# Patient Record
Sex: MALE | Race: OTHER | HISPANIC OR LATINO
[De-identification: names, ages, dates, MRNs, and addresses within clinical notes are randomized per-mention and may not be internally consistent; named-entity substitution may affect disease eponyms.]

---

## 2020-11-04 ENCOUNTER — TRANSCRIPTION ENCOUNTER (OUTPATIENT)
Age: 60
End: 2020-11-04

## 2021-02-10 ENCOUNTER — NON-APPOINTMENT (OUTPATIENT)
Age: 61
End: 2021-02-10

## 2021-02-10 PROBLEM — Z00.00 ENCOUNTER FOR PREVENTIVE HEALTH EXAMINATION: Status: ACTIVE | Noted: 2021-02-10

## 2021-02-24 ENCOUNTER — APPOINTMENT (OUTPATIENT)
Dept: OTOLARYNGOLOGY | Facility: CLINIC | Age: 61
End: 2021-02-24
Payer: COMMERCIAL

## 2021-02-24 PROCEDURE — 99072 ADDL SUPL MATRL&STAF TM PHE: CPT

## 2021-02-24 PROCEDURE — 92537 CALORIC VSTBLR TEST W/REC: CPT

## 2021-02-24 PROCEDURE — 92540 BASIC VESTIBULAR EVALUATION: CPT

## 2021-08-05 ENCOUNTER — APPOINTMENT (OUTPATIENT)
Dept: PHARMACY | Facility: CLINIC | Age: 61
End: 2021-08-05

## 2023-03-06 ENCOUNTER — APPOINTMENT (OUTPATIENT)
Dept: GASTROENTEROLOGY | Facility: CLINIC | Age: 63
End: 2023-03-06
Payer: COMMERCIAL

## 2023-03-06 VITALS
SYSTOLIC BLOOD PRESSURE: 130 MMHG | HEART RATE: 66 BPM | TEMPERATURE: 98 F | BODY MASS INDEX: 26.33 KG/M2 | HEIGHT: 65 IN | DIASTOLIC BLOOD PRESSURE: 76 MMHG | OXYGEN SATURATION: 99 % | WEIGHT: 158 LBS

## 2023-03-06 DIAGNOSIS — Z01.818 ENCOUNTER FOR OTHER PREPROCEDURAL EXAMINATION: ICD-10-CM

## 2023-03-06 DIAGNOSIS — K63.5 POLYP OF COLON: ICD-10-CM

## 2023-03-06 DIAGNOSIS — R10.13 EPIGASTRIC PAIN: ICD-10-CM

## 2023-03-06 PROCEDURE — 99204 OFFICE O/P NEW MOD 45 MIN: CPT

## 2023-03-06 RX ORDER — GABAPENTIN 100 MG/1
100 CAPSULE ORAL
Qty: 60 | Refills: 0 | Status: ACTIVE | COMMUNITY
Start: 2022-10-19

## 2023-03-06 RX ORDER — ACETAZOLAMIDE 250 MG/1
250 TABLET ORAL
Qty: 180 | Refills: 0 | Status: ACTIVE | COMMUNITY
Start: 2023-01-09

## 2023-03-06 RX ORDER — ASPIRIN 81 MG/1
81 TABLET, COATED ORAL
Qty: 90 | Refills: 0 | Status: ACTIVE | COMMUNITY
Start: 2022-10-18

## 2023-03-06 RX ORDER — POLYETHYLENE GLYCOL 3350 AND ELECTROLYTES WITH LEMON FLAVOR 236; 22.74; 6.74; 5.86; 2.97 G/4L; G/4L; G/4L; G/4L; G/4L
236 POWDER, FOR SOLUTION ORAL
Qty: 1 | Refills: 0 | Status: ACTIVE | COMMUNITY
Start: 2023-03-06 | End: 1900-01-01

## 2023-03-06 RX ORDER — CLOTRIMAZOLE 10 MG/G
1 CREAM TOPICAL
Qty: 60 | Refills: 0 | Status: ACTIVE | COMMUNITY
Start: 2022-10-10

## 2023-03-06 RX ORDER — ADHESIVE TAPE 3"X 2.3 YD
50 MCG TAPE, NON-MEDICATED TOPICAL
Qty: 30 | Refills: 0 | Status: ACTIVE | COMMUNITY
Start: 2022-10-10

## 2023-03-06 RX ORDER — DICLOFENAC SODIUM 1% 10 MG/G
1 GEL TOPICAL
Qty: 100 | Refills: 0 | Status: ACTIVE | COMMUNITY
Start: 2022-10-03

## 2023-03-06 RX ORDER — ROSUVASTATIN CALCIUM 10 MG/1
10 TABLET, FILM COATED ORAL
Qty: 90 | Refills: 0 | Status: ACTIVE | COMMUNITY
Start: 2022-10-10

## 2023-03-06 NOTE — HISTORY OF PRESENT ILLNESS
[de-identified] : The upper endoscopy performed on April 9, 2010 revealed mild diffuse bile gastritis with multiple fundic gland polyps.  The pathology revealed unremarkable esophageal, squamous, gastric antral and transitional mucosa with no evidence of intestinal metaplasia that was negative for Helicobacter pylori and fragments of fundic gland polyps.\par  [FreeTextEntry1] : The colonoscopy to the cecum performed on September 21, 2011 revealed two small rectal polyps and small internal hemorrhoids.  The rectal polyps were snared and removed.  There was no bleeding post polypectomy.  There were no other polyps, masses, diverticulosis, AVMs or colitis noted.  The pathology revealed hyperplastic polyp.  \par

## 2023-03-06 NOTE — ASSESSMENT
[FreeTextEntry1] : Dyspepsia: The patient complains of dyspeptic symptoms.  The patient was advised to abide by an anti-gas (low FOD-MAP) diet.  The patient was given a pamphlet for anti-gas (low FOD-MAP).  The patient and I reviewed the anti-gas (low FOD-MAP) diet at length. The patient is to start on a trial of Simethicone one tablet 4 times a day p.r.n. abdominal pain and gas.\par History of Colonic Polyps: The patient has a history of colonic polyps.  I recommend a repeat colonoscopy to reassess for colonic polyps.  The patient was told of the risks and benefits of the procedure.  The patient was told of the risks of perforation, emergency surgery, bleeding, infections and missed lesions.  The patient is to be on a clear liquid diet the entire day prior to the procedure. The patient is to complete the entire prescribed bowel prep the day prior to the procedure as directed. The patient is to have a COVID 19 PCR nasopharyngeal swab performed at the approved sites 3 days prior to the procedure.  The patient is told not to drive, drink alcohol, use recreational drugs, exercise, or work the day of the procedure.  The patient was told of the need for an escort to accompany the patient home after the procedure. The patient is aware that the procedure may be cancelled if they fail to follow the directions.  The patient agreed and will schedule for the procedure. The patient is to be n.p.o. after midnight and bowel prep was given.  The patient is to return for the procedure. \par Colonoscopy: I recommend a colonoscopy to assess the symptoms and for colonic polyps.  The patient was told of the risks and benefits of the procedure.  The patient was told of the risks of perforation, emergency surgery, bleeding, infections and missed lesions.  The patient is to be on a clear liquid diet the entire day prior to the procedure. The patient is to complete the entire prescribed bowel prep the day prior to the procedure as directed. The patient is to have a COVID 19 PCR nasopharyngeal swab performed at the approved sites 3 days prior to the procedure.  The patient is told not to drive, drink alcohol, use recreational drugs, exercise, or work the day of the procedure.  The patient was told of the need for an escort to accompany the patient home after the procedure. The patient is aware that the procedure may be cancelled if they fail to follow the directions.  The patient agreed and will schedule for the procedure. The patient is to be n.p.o. after midnight and bowel prep was given.  The patient is to return for the procedure.\par Follow-up: The patient is to follow-up in the office in 4 weeks to reassess the symptoms. The patient was told to call the office if any further problems. \par

## 2023-03-06 NOTE — REVIEW OF SYSTEMS
[Feeling Tired] : feeling tired [Loss Of Hearing] : hearing loss [Diarrhea] : diarrhea [Bloating (gassiness)] : bloating [Dizziness] : dizziness [Negative] : Heme/Lymph [FreeTextEntry3] : blurred visiom [FreeTextEntry4] : phlegm [FreeTextEntry8] : nocturia

## 2023-04-26 ENCOUNTER — NON-APPOINTMENT (OUTPATIENT)
Age: 63
End: 2023-04-26

## 2023-04-27 ENCOUNTER — TRANSCRIPTION ENCOUNTER (OUTPATIENT)
Age: 63
End: 2023-04-27

## 2023-04-27 ENCOUNTER — OUTPATIENT (OUTPATIENT)
Dept: OUTPATIENT SERVICES | Facility: HOSPITAL | Age: 63
LOS: 1 days | End: 2023-04-27
Payer: COMMERCIAL

## 2023-04-27 ENCOUNTER — APPOINTMENT (OUTPATIENT)
Dept: GASTROENTEROLOGY | Facility: HOSPITAL | Age: 63
End: 2023-04-27

## 2023-04-27 VITALS
HEIGHT: 64 IN | OXYGEN SATURATION: 100 % | HEART RATE: 73 BPM | DIASTOLIC BLOOD PRESSURE: 52 MMHG | WEIGHT: 156.09 LBS | SYSTOLIC BLOOD PRESSURE: 103 MMHG | TEMPERATURE: 98 F | RESPIRATION RATE: 18 BRPM

## 2023-04-27 VITALS
DIASTOLIC BLOOD PRESSURE: 57 MMHG | HEART RATE: 84 BPM | SYSTOLIC BLOOD PRESSURE: 116 MMHG | OXYGEN SATURATION: 99 % | RESPIRATION RATE: 16 BRPM

## 2023-04-27 DIAGNOSIS — Z98.890 OTHER SPECIFIED POSTPROCEDURAL STATES: Chronic | ICD-10-CM

## 2023-04-27 DIAGNOSIS — K63.5 POLYP OF COLON: ICD-10-CM

## 2023-04-27 PROCEDURE — 45378 DIAGNOSTIC COLONOSCOPY: CPT

## 2023-04-27 RX ORDER — SODIUM CHLORIDE 9 MG/ML
500 INJECTION INTRAMUSCULAR; INTRAVENOUS; SUBCUTANEOUS
Refills: 0 | Status: COMPLETED | OUTPATIENT
Start: 2023-04-27 | End: 2023-04-27

## 2023-04-27 RX ADMIN — SODIUM CHLORIDE 30 MILLILITER(S): 9 INJECTION INTRAMUSCULAR; INTRAVENOUS; SUBCUTANEOUS at 14:14

## 2023-04-27 NOTE — ASU DISCHARGE PLAN (ADULT/PEDIATRIC) - NS MD DC FALL RISK RISK
For information on Fall & Injury Prevention, visit: [email or web address] OR  [email or web address] OR  https://www.cdc.gov/steadi/patient.html

## 2023-04-28 PROBLEM — E78.00 PURE HYPERCHOLESTEROLEMIA, UNSPECIFIED: Chronic | Status: ACTIVE | Noted: 2023-04-27

## 2023-06-07 ENCOUNTER — APPOINTMENT (OUTPATIENT)
Dept: GASTROENTEROLOGY | Facility: CLINIC | Age: 63
End: 2023-06-07
Payer: COMMERCIAL

## 2023-06-07 VITALS
HEIGHT: 65 IN | DIASTOLIC BLOOD PRESSURE: 74 MMHG | TEMPERATURE: 97.2 F | BODY MASS INDEX: 25.49 KG/M2 | SYSTOLIC BLOOD PRESSURE: 126 MMHG | HEART RATE: 64 BPM | OXYGEN SATURATION: 98 % | WEIGHT: 153 LBS

## 2023-06-07 DIAGNOSIS — Z86.010 PERSONAL HISTORY OF COLONIC POLYPS: ICD-10-CM

## 2023-06-07 DIAGNOSIS — K64.8 OTHER HEMORRHOIDS: ICD-10-CM

## 2023-06-07 PROCEDURE — 99214 OFFICE O/P EST MOD 30 MIN: CPT

## 2023-06-07 RX ORDER — HYDROCORTISONE 25 MG/G
2.5 CREAM TOPICAL
Qty: 2 | Refills: 3 | Status: ACTIVE | COMMUNITY
Start: 2023-06-07 | End: 1900-01-01

## 2023-06-07 NOTE — ASSESSMENT
[FreeTextEntry1] : Dyspepsia: The patient complains of dyspeptic symptoms.  The patient was advised to continue to abide by an anti-gas (low FOD-MAP) diet.  The patient was previously given a pamphlet for anti-gas (low FOD-MAP).  The patient and I reviewed the anti-gas (low FOD-MAP)diet at length again. The patient is to continue on a trial of Simethicone one tablet 4 times a day p.r.n. abdominal pain and gas.\par Internal Hemorrhoids: The patient is to consider starting a trial of Anusol H. C. suppositories one per rectum nightly and Anusol HC2 .5% cream apply to affected area twice a day p.r.n. hemorrhoidal bleeding or pain. I also recommend a trial of Calmoseptine cream apply to affected area twice a day for hemorrhoidal discomfort.  I recommend Tucks pads for the hemorrhoids.  I recommend Sitz baths twice a day for the hemorrhoids.  I recommend avoid wearing tight underwear and use boxers.  I recommend avoid touching the perineal area.    The patient agreed to followup. \par History of Colonic Polyps: The patient has a history of colonic polyps.  I recommend a repeat colonoscopy in 5 years to reassess for colonic polyps unless symptomatic.  The patient agreed and will follow up for the procedure. \par Follow-up: The patient is to follow-up in the office in 1 year to reassess the symptoms. The patient was told to call the office if any further problems. \par

## 2023-06-07 NOTE — HISTORY OF PRESENT ILLNESS
[FreeTextEntry1] : The colonoscopy to the cecum performed at the [facility] [place] [facility] endoscopy suite on April 27, 2023 revealed a normal but very long and tortuous colon and small internal hemorrhoids.  There were no polyps, masses, diverticulosis, AVMs or colitis noted.  \par \par The colonoscopy to the cecum performed on September 21, 2011 revealed two small rectal polyps and small internal hemorrhoids.  The rectal polyps were snared and removed.  There was no bleeding post polypectomy.  There were no other polyps, masses, diverticulosis, AVMs or colitis noted.  The pathology revealed hyperplastic polyp.  \par  [de-identified] : The upper endoscopy performed on April 9, 2010 revealed mild diffuse bile gastritis with multiple fundic gland polyps.  The pathology revealed unremarkable esophageal, squamous, gastric antral and transitional mucosa with no evidence of intestinal metaplasia that was negative for Helicobacter pylori and fragments of fundic gland polyps.\par

## (undated) DEVICE — SYR LUER LOK 50CC

## (undated) DEVICE — SOLIDIFIER ISOLYZER 2000 CC

## (undated) DEVICE — FORCEP BIOPSY 2.5MM DISP

## (undated) DEVICE — ADAPTER ENDO CHNL SINGLE USE

## (undated) DEVICE — KIT ENDO PROCEDURE CUST W/VLV

## (undated) DEVICE — DRSG CURITY GAUZE SPONGE 4 X 4" 12-PLY

## (undated) DEVICE — CLAMP BX HOT RAD JAW 3

## (undated) DEVICE — TUBING MEDI-VAC W MAXIGRIP CONNECTORS 1/4"X6'

## (undated) DEVICE — NDL INJ SCLERO INTERJECT 23G

## (undated) DEVICE — RETRIEVER ROTH NET PLATINUM-UNIVERSAL

## (undated) DEVICE — CATH ELCTR GLIDE PRB 7FR

## (undated) DEVICE — LUBRICATING JELLY ONESHOT 1.25OZ

## (undated) DEVICE — SNARE LOOP POLY DISP 30MM LOOP

## (undated) DEVICE — SOL INJ NS 0.9% 500ML 1-PORT

## (undated) DEVICE — STERIS DEFENDO 3-PIECE KIT (AIR/WATER, SUCTION & BIOPSY VALVES)

## (undated) DEVICE — TUBING IV SET GRAVITY 3Y 100" MACRO

## (undated) DEVICE — TUBING CANNULA SALTER LABS NASAL ADULT 7FT

## (undated) DEVICE — SENSOR O2 FINGER ADULT